# Patient Record
Sex: MALE | Race: WHITE | NOT HISPANIC OR LATINO | Employment: FULL TIME | URBAN - METROPOLITAN AREA
[De-identification: names, ages, dates, MRNs, and addresses within clinical notes are randomized per-mention and may not be internally consistent; named-entity substitution may affect disease eponyms.]

---

## 2017-07-09 ENCOUNTER — HOSPITAL ENCOUNTER (EMERGENCY)
Facility: HOSPITAL | Age: 27
Discharge: HOME | End: 2017-07-10
Attending: EMERGENCY MEDICINE | Admitting: EMERGENCY MEDICINE

## 2017-07-09 DIAGNOSIS — R10.11 RUQ ABDOMINAL PAIN: Primary | ICD-10-CM

## 2017-07-09 LAB
ALBUMIN SERPL-MCNC: 3.5 G/DL (ref 3.5–5.2)
ALBUMIN/GLOB SERPL: 1.1 G/DL
ALP SERPL-CCNC: 52 U/L (ref 39–117)
ALT SERPL W P-5'-P-CCNC: 94 U/L (ref 1–41)
ANION GAP SERPL CALCULATED.3IONS-SCNC: 11.7 MMOL/L
AST SERPL-CCNC: 75 U/L (ref 1–40)
BACTERIA UR QL AUTO: NORMAL /HPF
BILIRUB SERPL-MCNC: 1.1 MG/DL (ref 0.1–1.2)
BILIRUB UR QL STRIP: NEGATIVE
BUN BLD-MCNC: 12 MG/DL (ref 6–20)
BUN/CREAT SERPL: 9.8 (ref 7–25)
CALCIUM SPEC-SCNC: 8.7 MG/DL (ref 8.6–10.5)
CHLORIDE SERPL-SCNC: 99 MMOL/L (ref 98–107)
CLARITY UR: ABNORMAL
CO2 SERPL-SCNC: 23.3 MMOL/L (ref 22–29)
COLOR UR: ABNORMAL
CREAT BLD-MCNC: 1.23 MG/DL (ref 0.76–1.27)
DEPRECATED RDW RBC AUTO: 44.1 FL (ref 37–54)
EOSINOPHIL # BLD MANUAL: 0.19 10*3/MM3 (ref 0–0.7)
EOSINOPHIL NFR BLD MANUAL: 2 % (ref 0.3–6.2)
ERYTHROCYTE [DISTWIDTH] IN BLOOD BY AUTOMATED COUNT: 14 % (ref 11.5–14.5)
GFR SERPL CREATININE-BSD FRML MDRD: 71 ML/MIN/1.73
GLOBULIN UR ELPH-MCNC: 3.3 GM/DL
GLUCOSE BLD-MCNC: 98 MG/DL (ref 65–99)
GLUCOSE UR STRIP-MCNC: NEGATIVE MG/DL
HCT VFR BLD AUTO: 39.1 % (ref 40.4–52.2)
HGB BLD-MCNC: 13.9 G/DL (ref 13.7–17.6)
HGB UR QL STRIP.AUTO: NEGATIVE
HOLD SPECIMEN: NORMAL
HOLD SPECIMEN: NORMAL
HYALINE CASTS UR QL AUTO: NORMAL /LPF
KETONES UR QL STRIP: ABNORMAL
LEUKOCYTE ESTERASE UR QL STRIP.AUTO: ABNORMAL
LIPASE SERPL-CCNC: 78 U/L (ref 13–60)
LYMPHOCYTES # BLD MANUAL: 4.82 10*3/MM3 (ref 0.9–4.8)
LYMPHOCYTES NFR BLD MANUAL: 1 % (ref 5–12)
LYMPHOCYTES NFR BLD MANUAL: 52 % (ref 19.6–45.3)
MCH RBC QN AUTO: 30.9 PG (ref 27–32.7)
MCHC RBC AUTO-ENTMCNC: 35.5 G/DL (ref 32.6–36.4)
MCV RBC AUTO: 86.9 FL (ref 79.8–96.2)
MONOCYTES # BLD AUTO: 0.09 10*3/MM3 (ref 0.2–1.2)
NEUTROPHILS # BLD AUTO: 3.52 10*3/MM3 (ref 1.9–8.1)
NEUTROPHILS NFR BLD MANUAL: 38 % (ref 42.7–76)
NITRITE UR QL STRIP: NEGATIVE
PH UR STRIP.AUTO: 6 [PH] (ref 5–8)
PLAT MORPH BLD: NORMAL
PLATELET # BLD AUTO: 233 10*3/MM3 (ref 140–500)
PMV BLD AUTO: 10.2 FL (ref 6–12)
POTASSIUM BLD-SCNC: 3.5 MMOL/L (ref 3.5–5.2)
PROT SERPL-MCNC: 6.8 G/DL (ref 6–8.5)
PROT UR QL STRIP: ABNORMAL
RBC # BLD AUTO: 4.5 10*6/MM3 (ref 4.6–6)
RBC # UR: NORMAL /HPF
RBC MORPH BLD: NORMAL
REF LAB TEST METHOD: NORMAL
SCAN SLIDE: NORMAL
SMUDGE CELLS BLD QL SMEAR: ABNORMAL
SODIUM BLD-SCNC: 134 MMOL/L (ref 136–145)
SP GR UR STRIP: >=1.03 (ref 1–1.03)
SQUAMOUS #/AREA URNS HPF: NORMAL /HPF
UROBILINOGEN UR QL STRIP: ABNORMAL
VARIANT LYMPHS NFR BLD MANUAL: 7 % (ref 0–5)
WBC NRBC COR # BLD: 9.26 10*3/MM3 (ref 4.5–10.7)
WBC UR QL AUTO: NORMAL /HPF
WHOLE BLOOD HOLD SPECIMEN: NORMAL
WHOLE BLOOD HOLD SPECIMEN: NORMAL

## 2017-07-09 PROCEDURE — 85025 COMPLETE CBC W/AUTO DIFF WBC: CPT | Performed by: EMERGENCY MEDICINE

## 2017-07-09 PROCEDURE — 80053 COMPREHEN METABOLIC PANEL: CPT | Performed by: EMERGENCY MEDICINE

## 2017-07-09 PROCEDURE — 36415 COLL VENOUS BLD VENIPUNCTURE: CPT | Performed by: EMERGENCY MEDICINE

## 2017-07-09 PROCEDURE — 83690 ASSAY OF LIPASE: CPT | Performed by: EMERGENCY MEDICINE

## 2017-07-09 PROCEDURE — 85007 BL SMEAR W/DIFF WBC COUNT: CPT | Performed by: EMERGENCY MEDICINE

## 2017-07-09 PROCEDURE — 99284 EMERGENCY DEPT VISIT MOD MDM: CPT

## 2017-07-09 PROCEDURE — 81001 URINALYSIS AUTO W/SCOPE: CPT | Performed by: EMERGENCY MEDICINE

## 2017-07-09 RX ORDER — SODIUM CHLORIDE 0.9 % (FLUSH) 0.9 %
10 SYRINGE (ML) INJECTION AS NEEDED
Status: DISCONTINUED | OUTPATIENT
Start: 2017-07-09 | End: 2017-07-10 | Stop reason: HOSPADM

## 2017-07-09 RX ORDER — HYDROCODONE BITARTRATE AND ACETAMINOPHEN 5; 325 MG/1; MG/1
1 TABLET ORAL EVERY 4 HOURS PRN
COMMUNITY

## 2017-07-09 RX ORDER — RANITIDINE 150 MG/1
150 TABLET ORAL 2 TIMES DAILY
COMMUNITY

## 2017-07-09 RX ORDER — PROMETHAZINE HYDROCHLORIDE 25 MG/1
25 TABLET ORAL EVERY 8 HOURS PRN
COMMUNITY

## 2017-07-09 NOTE — ED TRIAGE NOTES
Pt c/o abd pain. Pt seen at [facility] on Friday for same complaint. Negative work up. Symptoms worse since onset. abd pain x 1 week. Pt denies n/v/d.

## 2017-07-10 VITALS
BODY MASS INDEX: 35 KG/M2 | WEIGHT: 250 LBS | HEART RATE: 85 BPM | TEMPERATURE: 98.9 F | RESPIRATION RATE: 18 BRPM | DIASTOLIC BLOOD PRESSURE: 54 MMHG | OXYGEN SATURATION: 98 % | SYSTOLIC BLOOD PRESSURE: 122 MMHG | HEIGHT: 71 IN

## 2017-07-10 NOTE — ED NOTES
Family came up to desk reporting worsening of abd pain. Informed them of current wait and that we will do our best to get him back to room.     Vicky Woo RN  07/09/17 5805

## 2017-07-10 NOTE — ED PROVIDER NOTES
" EMERGENCY DEPARTMENT ENCOUNTER    CHIEF COMPLAINT  Chief Complaint: abdominal pain  History given by: pt  History limited by: nothing  Room Number: 16/16  PMD: No Known Provider      HPI:  Pt is a 27 y.o. male who presents complaining of abdominal pain for one week. Pt states he had a fever about two weeks ago that is now resolved. He admits to nausea, fatigue, loss of appetite, and constipation but denies vomiting. Pt was seen at the ER at [facility] two days ago and had negative imaging and received nausea medication, hydrocodone, and Zantac which has provided no relief.    Duration:  One week  Onset: gradual  Timing: constant  Location: abdomen  Radiation: none  Quality: \"pain'  Intensity/Severity: moderate  Progression: unchanged  Associated Symptoms: fever, nausea, fatigue, constipation, los of appetite  Aggravating Factors: eating causes dry heaving  Alleviating Factors: unknown  Previous Episodes: No  Treatment before arrival: Pt was seen at the ER at [facility] two days ago and had negative imaging and received nausea medication, hydrocodone, and Zantac which has provided no relief.    PAST MEDICAL HISTORY  Active Ambulatory Problems     Diagnosis Date Noted   • No Active Ambulatory Problems     Resolved Ambulatory Problems     Diagnosis Date Noted   • No Resolved Ambulatory Problems     No Additional Past Medical History       PAST SURGICAL HISTORY  Past Surgical History:   Procedure Laterality Date   • EAR TUBES         FAMILY HISTORY  History reviewed. No pertinent family history.    SOCIAL HISTORY  Social History     Social History   • Marital status: Single     Spouse name: N/A   • Number of children: N/A   • Years of education: N/A     Occupational History   • Not on file.     Social History Main Topics   • Smoking status: Current Every Day Smoker     Packs/day: 1.00   • Smokeless tobacco: Not on file   • Alcohol use No   • Drug use: No   • Sexual activity: Defer     Other Topics Concern   • Not on file "     Social History Narrative   • No narrative on file       ALLERGIES  Penicillins    REVIEW OF SYSTEMS  Review of Systems   Constitutional: Positive for appetite change (loss), fatigue and fever. Negative for activity change.   HENT: Negative for congestion and sore throat.    Eyes: Negative.    Respiratory: Negative for cough and shortness of breath.    Cardiovascular: Negative for chest pain and leg swelling.   Gastrointestinal: Positive for abdominal pain, constipation and nausea. Negative for diarrhea and vomiting.   Endocrine: Negative.    Genitourinary: Negative for decreased urine volume and dysuria.   Musculoskeletal: Negative for neck pain.   Skin: Negative for rash and wound.   Allergic/Immunologic: Negative.    Neurological: Negative for weakness, numbness and headaches.   Hematological: Negative.    Psychiatric/Behavioral: Negative.    All other systems reviewed and are negative.      PHYSICAL EXAM  ED Triage Vitals   Temp Heart Rate Resp BP SpO2   07/09/17 1913 07/09/17 1913 07/09/17 1913 07/09/17 1916 07/09/17 1913   98.9 °F (37.2 °C) 128 18 124/83 94 %      Temp src Heart Rate Source Patient Position BP Location FiO2 (%)   07/09/17 1913 07/09/17 1913 07/09/17 2307 07/09/17 2307 --   Tympanic Monitor Sitting Right arm        Physical Exam   Constitutional: He is oriented to person, place, and time and well-developed, well-nourished, and in no distress.   HENT:   Head: Normocephalic and atraumatic.   Eyes: EOM are normal. Pupils are equal, round, and reactive to light.   Neck: Normal range of motion. Neck supple.   Cardiovascular: Normal rate, regular rhythm and normal heart sounds.    Pulmonary/Chest: Effort normal and breath sounds normal. No respiratory distress.   Abdominal: Soft. There is tenderness (mild) in the right upper quadrant. There is no rebound and no guarding.   Musculoskeletal: Normal range of motion. He exhibits no edema.   Neurological: He is alert and oriented to person, place, and  time. He has normal sensation and normal strength.   Skin: Skin is warm and dry.   Psychiatric: Mood and affect normal.   Nursing note and vitals reviewed.      LAB RESULTS  Lab Results (last 24 hours)     Procedure Component Value Units Date/Time    CBC & Differential [[id number]] Collected:  07/09/17 1929    Specimen:  Blood Updated:  07/09/17 2012    Narrative:       The following orders were created for panel order CBC & Differential.  Procedure                               Abnormality         Status                     ---------                               -----------         ------                     Manual Differential[[id number]]          Abnormal            Final result               Scan Slide[[id number]]                                       Final result               CBC Auto Differential[[id number]]        Abnormal            Final result                 Please view results for these tests on the individual orders.    Comprehensive Metabolic Panel [[id number]]  (Abnormal) Collected:  07/09/17 1929    Specimen:  Blood Updated:  07/09/17 2019     Glucose 98 mg/dL      BUN 12 mg/dL      Creatinine 1.23 mg/dL      Sodium 134 (L) mmol/L      Potassium 3.5 mmol/L      Chloride 99 mmol/L      CO2 23.3 mmol/L      Calcium 8.7 mg/dL      Total Protein 6.8 g/dL      Albumin 3.50 g/dL      ALT (SGPT) 94 (H) U/L      AST (SGOT) 75 (H) U/L      Alkaline Phosphatase 52 U/L      Total Bilirubin 1.1 mg/dL      eGFR Non African Amer 71 mL/min/1.73      Globulin 3.3 gm/dL      A/G Ratio 1.1 g/dL      BUN/Creatinine Ratio 9.8     Anion Gap 11.7 mmol/L     Lipase [[id number]]  (Abnormal) Collected:  07/09/17 1929    Specimen:  Blood Updated:  07/09/17 2019     Lipase 78 (H) U/L     CBC Auto Differential [[id number]]  (Abnormal) Collected:  07/09/17 1929    Specimen:  Blood Updated:  07/09/17 2012     WBC 9.26 10*3/mm3      RBC 4.50 (L) 10*6/mm3      Hemoglobin 13.9 g/dL      Hematocrit 39.1 (L) %      MCV 86.9 fL      MCH  30.9 pg      MCHC 35.5 g/dL      RDW 14.0 %      RDW-SD 44.1 fl      MPV 10.2 fL      Platelets 233 10*3/mm3     Scan Slide [[id number]] Collected:  07/09/17 1929    Specimen:  Blood Updated:  07/09/17 2012     Scan Slide --      See Manual Differential Results       Manual Differential [[id number]]  (Abnormal) Collected:  07/09/17 1929    Specimen:  Blood Updated:  07/09/17 2012     Neutrophil % 38.0 (L) %      Lymphocyte % 52.0 (H) %      Monocyte % 1.0 (L) %      Eosinophil % 2.0 %      Atypical Lymphocyte % 7.0 (H) %      Neutrophils Absolute 3.52 10*3/mm3      Lymphocytes Absolute 4.82 (H) 10*3/mm3      Monocytes Absolute 0.09 (L) 10*3/mm3      Eosinophils Absolute 0.19 10*3/mm3      RBC Morphology Normal     Smudge Cells Slight/1+     Platelet Morphology Normal    Urinalysis With / Culture If Indicated [[id number]]  (Abnormal) Collected:  07/09/17 2307    Specimen:  Urine from Urine, Clean Catch Updated:  07/09/17 2322     Color, UA Dark Yellow (A)     Appearance, UA Cloudy (A)     pH, UA 6.0     Specific Gravity, UA >=1.030     Glucose, UA Negative     Ketones, UA Trace (A)     Bilirubin, UA Negative     Blood, UA Negative     Protein,  mg/dL (2+) (A)     Leuk Esterase, UA Trace (A)     Nitrite, UA Negative     Urobilinogen, UA 1.0 E.U./dL    Urinalysis, Microscopic Only [[id number]] Collected:  07/09/17 2307    Specimen:  Urine from Urine, Clean Catch Updated:  07/09/17 2322     RBC, UA 0-2 /HPF      WBC, UA 0-2 /HPF      Bacteria, UA None Seen /HPF      Squamous Epithelial Cells, UA 0-2 /HPF      Hyaline Casts, UA 3-6 /LPF      Methodology Automated Microscopy          I ordered the above labs and reviewed the results    PROCEDURES  Procedures      PROGRESS AND CONSULTS  ED Course   Comment By Time   Patient has mild right upper quadrant tenderness on exam, but his workup is unremarkable.  He had a full workup at [facility] 2 days ago including a CT scan and ultrasound.  When comparing his labs to [facility] 2  days ago, his LFTs and lipase are slightly improved.  He has been a heavy user of alcohol in the past, and has fatty liver.  GI follow-up is recommended. Ventura Rutherford MD 07/10 0006     1916 - Ordered labs for further evaluation.     2330 - Discussed plan to discharge the pt and follow up with a GI specialist. Pt understands and agrees with plan. All questions addressed.    MEDICAL DECISION MAKING  Results were reviewed/discussed with the patient and they were also made aware of online access. Pt also made aware that some labs, such as cultures, will not be resulted during ER visit and follow up with PMD is necessary.     MDM  Number of Diagnoses or Management Options     Amount and/or Complexity of Data Reviewed  Clinical lab tests: ordered and reviewed (Labs are improved compared to Friday)  Review and summarize past medical records: yes (CT scan Friday showed fatty infiltrate of the liver but nothing else. US Gallbladder was unremarkable)           DIAGNOSIS  Final diagnoses:   RUQ abdominal pain       DISPOSITION  DISCHARGE    Patient discharged in stable condition.    Reviewed implications of results, diagnosis, meds, responsibility to follow up, warning signs and symptoms of possible worsening, potential complications and reasons to return to ER.    Patient/Family voiced understanding of above instructions.    Discussed plan for discharge, as there is no emergent indication for admission.  Pt/family is agreeable and understands need for follow up and repeat testing.  Pt is aware that discharge does not mean that nothing is wrong but it indicates no emergency is present that requires admission and they must continue care with follow-up as given below or physician of their choice.     FOLLOW-UP  Ben Morse MD  [address] [id number] [phone]               Medication List      Notice     No changes were made to your prescriptions during this visit.            Latest Documented  Vital Signs:  As of 11:40 PM  BP- 132/75 HR- 86 Temp- 98.9 °F (37.2 °C) (Tympanic) O2 sat- 100%    --  Documentation assistance provided by jennifer Andres for Dr. Rutherford.  Information recorded by the scribe was done at my direction and has been verified and validated by me.     Maximus Andres  07/09/17 2344       Ventura Rutherford MD  07/10/17 0006

## 2017-07-10 NOTE — ED NOTES
Pt agitated, angry as well as spouse, states [facility] did nothing for patient and now we aren't doing anything for him, got dressed and received discharge instructions and packet, did not sign, md notified and charge nurse notified     Chuyita Morris, GEETHA  07/10/17 0027

## 2023-10-26 ENCOUNTER — TRANSCRIBE ORDERS (OUTPATIENT)
Dept: PHYSICAL THERAPY | Facility: CLINIC | Age: 33
End: 2023-10-26
Payer: OTHER MISCELLANEOUS

## 2023-10-26 DIAGNOSIS — S39.012A STRAIN OF LUMBAR REGION, INITIAL ENCOUNTER: Primary | ICD-10-CM

## 2023-10-30 ENCOUNTER — TREATMENT (OUTPATIENT)
Dept: PHYSICAL THERAPY | Facility: CLINIC | Age: 33
End: 2023-10-30
Payer: OTHER MISCELLANEOUS

## 2023-10-30 DIAGNOSIS — S39.012D STRAIN OF LUMBAR REGION, SUBSEQUENT ENCOUNTER: Primary | ICD-10-CM

## 2023-10-30 PROCEDURE — 97161 PT EVAL LOW COMPLEX 20 MIN: CPT | Performed by: PHYSICAL THERAPIST

## 2023-10-30 PROCEDURE — 97112 NEUROMUSCULAR REEDUCATION: CPT | Performed by: PHYSICAL THERAPIST

## 2023-10-30 PROCEDURE — 97110 THERAPEUTIC EXERCISES: CPT | Performed by: PHYSICAL THERAPIST

## 2023-10-30 NOTE — PROGRESS NOTES
"    Physical Therapy Initial Evaluation and Plan of Care  [address] [place] [address], [address]  [place] [address]    Patient: Luisito Phoenix   : 1990  Diagnosis/ICD-10 Code:  Strain of lumbar region, subsequent encounter [S39.012D]  Referring practitioner: KENN Bernardo  Date of Initial Visit: 10/30/2023  Today's Date: 10/30/2023  Patient seen for 1 session         Visit Diagnoses:    ICD-10-CM ICD-9-CM   1. Strain of lumbar region, subsequent encounter  S39.012D V58.89     847.2         Subjective Questionnaire: Oswestry:       Subjective Evaluation    History of Present Illness  Date of onset: 10/16/2023  Mechanism of injury: Patent reports that he was bending over to look at a sink.  As he bent over, he felt pain in the low back.  Patient was seen in [facility] the next day.  Patient denies any previous back injuries.    History of \"blown disc\" in C5-6.      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  Patient ambulates with a normal gait pattern.    Palpation     Additional Palpation Details  No TTP to the lumbar spine.    Active Range of Motion     Lumbar   Flexion: Active lumbar flexion: about 85. with pain  Extension: Active lumbar extension: WNL. with pain  Left lateral flexion: Active left lumbar lateral flexion: about 20.   Right lateral flexion: Active right lumbar lateral flexion: about 25. with pain    Strength/Myotome Testing     Left Hip   Planes of Motion   Flexion: 4+  Abduction: 4+  Adduction: 4+    Right Hip   Planes of Motion   Flexion: 4+  Abduction: 4+  Adduction: 4+    Left Knee "   Extension: 4+    Right Knee   Extension: 4+    Tests     Additional Tests Details  - TROY bilaterally  - SLR          Assessment & Plan       Assessment  Impairments: abnormal or restricted ROM, activity intolerance, lacks appropriate home exercise program and pain with function   Assessment details: Patient presents with c/o pain, TTP and limited AROM of the lumbar spine which is limiting his ability to perform full job duties.  Barriers to therapy: none  Prognosis: good  Prognosis details: STG's to be met by 2 weeks  1)  Independent with HEP  2)  Decrease pain by 50% or more  3)  AROM WNL for the lumbar spine without pain    LTG's to be met by 4 weeks  1)  Independent with HEP progression  2)  Decrease pain by 75% or more  3)  Patient to lift floor to waist up to 40 lbs  4)  Patient to go up and down the ladder without c/o        Plan  Therapy options: will be seen for skilled therapy services  Planned therapy interventions: strengthening, stretching, therapeutic activities, home exercise program and neuromuscular re-education  Frequency: 2x week  Duration in weeks: 4  Treatment plan discussed with: patient            Timed:         Manual Therapy:    0     mins  57347;     Therapeutic Exercise:    14     mins  64955;     Neuromuscular Linda:    8    mins  81454;    Therapeutic Activity:     0     mins  38958;     Gait Trainin     mins  85239;     Ultrasound:     0     mins  84643;          Un-Timed:  Electrical Stimulation:    0     mins  61852 ( );    Low Eval     14     Mins  96757  Mod Eval     0     Mins  73890  High Eval                       0     Mins  25958        Timed Treatment:   22   mins   Total Treatment:     36   mins          PT: Michael Milligan, PT     [place] License [id number]  Electronically signed by Michael Milligan PT, 10/30/23, 2:58 PM EDT    Certification Period: 10/30/2023 thru 2024  I certify that the therapy services are furnished while this patient is under my  care.  The services outlined above are required by this patient, and will be reviewed every 90 days.    Caryl Galloway Pa  [address] [place] [address]   NPI: [id number]      Michael Milligan, PT   License number: [id number]        Physician Signature:__________________________________________________    PHYSICIAN: Caryl Galloway PA      DATE:     Please sign and return via fax to [email or web address] . Thank you, [facility].

## 2023-11-02 ENCOUNTER — TREATMENT (OUTPATIENT)
Dept: PHYSICAL THERAPY | Facility: CLINIC | Age: 33
End: 2023-11-02
Payer: OTHER MISCELLANEOUS

## 2023-11-02 DIAGNOSIS — S39.012D STRAIN OF LUMBAR REGION, SUBSEQUENT ENCOUNTER: Primary | ICD-10-CM

## 2023-11-02 PROCEDURE — 97112 NEUROMUSCULAR REEDUCATION: CPT | Performed by: PHYSICAL THERAPIST

## 2023-11-02 PROCEDURE — 97110 THERAPEUTIC EXERCISES: CPT | Performed by: PHYSICAL THERAPIST

## 2023-11-02 PROCEDURE — 97530 THERAPEUTIC ACTIVITIES: CPT | Performed by: PHYSICAL THERAPIST

## 2023-11-02 NOTE — PROGRESS NOTES
Physical Therapy Daily Treatment Note  [address], [address]  [place] [address]      Patient: Luisito Phoenix   : 1990  Referring practitioner: KENN Bernardo  Date of Initial Visit: Type: THERAPY  Noted: 10/30/2023  Today's Date: 2023  Patient seen for 2 sessions       Visit Diagnoses:    ICD-10-CM ICD-9-CM   1. Strain of lumbar region, subsequent encounter  S39.012D V58.89     847.2           Subjective   Patient reports pain in the back rated at 4/10.    Objective   See Exercise, Manual, and Modality Logs for complete treatment.       Assessment/Plan  Subjective reports are slightly improved from the previous visit.  Continued with the KT secondary to the patient noting an improvement with it on.  Added PPT, spinal rotations and TONNY to the routine.  Patient performed the exercise routine without visual or verbal signs of pain in the lumbar spine.      Timed:         Manual Therapy:    0     mins  39621;     Therapeutic Exercise:    18     mins  25266;     Neuromuscular Linda:    8    mins  92968;    Therapeutic Activity:     8     mins  85508;     Gait Training      0    mins  66406;  Work Conditioning     0   mins  01010       Untimed:  Electrical Stimulation:    0     mins  34136 ( );      Timed Treatment:   34   mins   Total Treatment:     34   mins    Michael Milligan, PT  [place] License: [id number]

## 2023-11-06 ENCOUNTER — TREATMENT (OUTPATIENT)
Dept: PHYSICAL THERAPY | Facility: CLINIC | Age: 33
End: 2023-11-06
Payer: OTHER MISCELLANEOUS

## 2023-11-06 DIAGNOSIS — S39.012D STRAIN OF LUMBAR REGION, SUBSEQUENT ENCOUNTER: Primary | ICD-10-CM

## 2023-11-06 PROCEDURE — 97530 THERAPEUTIC ACTIVITIES: CPT | Performed by: PHYSICAL THERAPIST

## 2023-11-06 PROCEDURE — 97110 THERAPEUTIC EXERCISES: CPT | Performed by: PHYSICAL THERAPIST

## 2023-11-06 NOTE — PROGRESS NOTES
Physical Therapy Daily Treatment Note  [address], [address]  [place] [address]      Patient: Luisito Phoenix   : 1990  Referring practitioner: KENN Bernardo  Date of Initial Visit: Type: THERAPY  Noted: 10/30/2023  Today's Date: 2023  Patient seen for 3 sessions       Visit Diagnoses:    ICD-10-CM ICD-9-CM   1. Strain of lumbar region, subsequent encounter  S39.012D V58.89     847.2           Subjective   Patient reports that the back feels good, denies pain.  Feels like he is ready to return to work at this time.    Objective          Active Range of Motion     Lumbar   Flexion: Active lumbar flexion: WNL.   Extension: Active lumbar extension: WNL.   Left lateral flexion: Active left lumbar lateral flexion: WNL.   Right lateral flexion: Active right lumbar lateral flexion: WNL.       See Exercise, Manual, and Modality Logs for complete treatment.       Assessment/Plan  Subjective reports continue to be much improved.  Patient will follow up with the MD today in order to be released sooner.  Patient performed the routine without c/o pain in the lumbar spine.  Feel that the patient can be released at this time.      Timed:         Manual Therapy:    0     mins  56659;     Therapeutic Exercise:    11     mins  50753;     Neuromuscular Linda:    0    mins  87647;    Therapeutic Activity:     8     mins  49926;     Gait Training      0    mins  19143;  Work Conditioning     0   mins  71073       Untimed:  Electrical Stimulation:    0     mins  15856 ( );      Timed Treatment:   19   mins   Total Treatment:     19   mins    Michael Milligan, PT  [place] License: [id number]

## 2023-11-09 ENCOUNTER — DOCUMENTATION (OUTPATIENT)
Dept: PHYSICAL THERAPY | Facility: CLINIC | Age: 33
End: 2023-11-09